# Patient Record
Sex: FEMALE | Race: ASIAN | NOT HISPANIC OR LATINO | Employment: UNEMPLOYED | URBAN - METROPOLITAN AREA
[De-identification: names, ages, dates, MRNs, and addresses within clinical notes are randomized per-mention and may not be internally consistent; named-entity substitution may affect disease eponyms.]

---

## 2018-11-18 ENCOUNTER — OCCUPATIONAL MEDICINE (OUTPATIENT)
Dept: URGENT CARE | Facility: CLINIC | Age: 53
End: 2018-11-18
Payer: COMMERCIAL

## 2018-11-18 ENCOUNTER — APPOINTMENT (OUTPATIENT)
Dept: RADIOLOGY | Facility: IMAGING CENTER | Age: 53
End: 2018-11-18
Attending: NURSE PRACTITIONER
Payer: COMMERCIAL

## 2018-11-18 ENCOUNTER — APPOINTMENT (OUTPATIENT)
Dept: RADIOLOGY | Facility: IMAGING CENTER | Age: 53
End: 2018-11-18
Attending: NURSE PRACTITIONER
Payer: MEDICAID

## 2018-11-18 VITALS
DIASTOLIC BLOOD PRESSURE: 64 MMHG | SYSTOLIC BLOOD PRESSURE: 116 MMHG | OXYGEN SATURATION: 99 % | HEART RATE: 68 BPM | TEMPERATURE: 97.7 F

## 2018-11-18 DIAGNOSIS — S50.01XA CONTUSION OF RIGHT ELBOW, INITIAL ENCOUNTER: ICD-10-CM

## 2018-11-18 DIAGNOSIS — S16.1XXA STRAIN OF NECK MUSCLE, INITIAL ENCOUNTER: ICD-10-CM

## 2018-11-18 DIAGNOSIS — S70.01XA CONTUSION OF RIGHT HIP, INITIAL ENCOUNTER: ICD-10-CM

## 2018-11-18 DIAGNOSIS — S40.011A CONTUSION OF RIGHT SHOULDER, INITIAL ENCOUNTER: ICD-10-CM

## 2018-11-18 DIAGNOSIS — S46.911A STRAIN OF RIGHT SHOULDER, INITIAL ENCOUNTER: ICD-10-CM

## 2018-11-18 LAB
AMP AMPHETAMINE: NORMAL
BAR BARBITURATES: NORMAL
BREATH ALCOHOL COMMENT: NORMAL
BZO BENZODIAZEPINES: NORMAL
COC COCAINE: NORMAL
INT CON NEG: NORMAL
INT CON POS: POSITIVE
MDMA ECSTASY: NORMAL
MET METHAMPHETAMINES: NORMAL
MTD METHADONE: NORMAL
OPI OPIATES: NORMAL
OXY OXYCODONE: NORMAL
PCP PHENCYCLIDINE: NORMAL
POC BREATHALIZER: 0 PERCENT (ref 0–0.01)
POC URINE DRUG SCREEN OCDRS: NEGATIVE
THC: NORMAL

## 2018-11-18 PROCEDURE — 80305 DRUG TEST PRSMV DIR OPT OBS: CPT | Mod: 29 | Performed by: NURSE PRACTITIONER

## 2018-11-18 PROCEDURE — 73502 X-RAY EXAM HIP UNI 2-3 VIEWS: CPT | Mod: 26,RT | Performed by: NURSE PRACTITIONER

## 2018-11-18 PROCEDURE — 73030 X-RAY EXAM OF SHOULDER: CPT | Mod: 26,RT,29 | Performed by: NURSE PRACTITIONER

## 2018-11-18 PROCEDURE — 73080 X-RAY EXAM OF ELBOW: CPT | Mod: 26,RT,29 | Performed by: NURSE PRACTITIONER

## 2018-11-18 PROCEDURE — 99203 OFFICE O/P NEW LOW 30 MIN: CPT | Mod: 29 | Performed by: NURSE PRACTITIONER

## 2018-11-18 PROCEDURE — 82075 ASSAY OF BREATH ETHANOL: CPT | Mod: 29 | Performed by: NURSE PRACTITIONER

## 2018-11-18 RX ORDER — CYCLOBENZAPRINE HCL 10 MG
10 TABLET ORAL 3 TIMES DAILY PRN
Qty: 30 TAB | Refills: 0 | Status: SHIPPED | OUTPATIENT
Start: 2018-11-18 | End: 2020-06-10

## 2018-11-18 NOTE — LETTER
[facility] [address] [place] [address]  Phone:  [phone] - Fax:  [phone]   [facility] Progress Report and Disability Certification  Date of Service: 11/18/2018   No Show:  No  Date / Time of Next Visit: 11/21/2018@9:15am   Claim Information   Patient Name: Janell Sterling  Claim Number:     Employer: [employer]  Date of Injury: 11/15/2018     Insurer / TPA: [employer]  ID / SSN:     Occupation: Dealer  Diagnosis: Diagnoses of Contusion of right shoulder, initial encounter, Contusion of right hip, initial encounter, Contusion of right elbow, initial encounter, Strain of right shoulder, initial encounter, and Strain of neck muscle, initial encounter were pertinent to this visit.    Medical Information   Related to Industrial Injury? Yes    Subjective Complaints:  DOI: 11/15/18  First visit.  Patient states that she was at work on 11/15/18 when she slipped on a wet floor.  She states that her right leg slipped out forward and she fell on her right side, hitting her hip and shoulder on the floor.  She strained the right side of her neck when she fell also.  She has been having pain to the right shoulder and right upper back since.  She reports that is her greatest area of pain.  Patient is also having pain in the right hip with painful ambulation.  Pain radiates into the right buttock and lower back.  She has been having pain and painful range of motion since.   Objective Findings: There is point tenderness to the posterior right shoulder and right upper back.  There is point tenderness over the right shoulder and.  Painful range of motion with abduction greater than 90 degrees.       are 5/5 and equal in the upper extremities.  Strength is 3/5 on the right and 5/5 on the left.      There is mild point tenderness to the medial and lateral aspect of the right elbow, limited range of motion with flexion.      There is  point tenderness to the lateral aspect of the right hip and patient has an antalgic gait.No point tenderness over the lumbar spine.    Pre-Existing Condition(s):     Assessment:   Initial Visit    Status: Additional Care Required  Permanent Disability:No    Plan: Medication  Comments:ibuprofen    Diagnostics: X-ray    Comments:  All x-rays negative     Disability Information   Status: Released to Restricted Duty    From:  2018  Through: 2018 Restrictions are: Temporary   Physical Restrictions   Sitting:    Standing:    Stooping:    Bending:      Squattin hrs/day Walking:  < or = to 2 hrs/day Climbin hrs/day Pushing:  < or = to 1 hr/day   Pulling:  < or = to 1 hr/day Other:    Reaching Above Shoulder (L):   Reaching Above Shoulder (R): 0 hrs/day     Reaching Below Shoulder (L):    Reaching Below Shoulder (R):      Not to exceed Weight Limits   Carrying(hrs): 4 Weight Limit(lb): < or = to 10 pounds Lifting(hrs): 4 Weight  Limit(lb): < or = to 10 pounds   Comments: Ibuprofen PRN, flexeril, ice, rest.  Clearly stated no driving or alcohol with Flexeril and do not take this at work.    Repetitive Actions   Hands: i.e. Fine Manipulations from Grasping:     Feet: i.e. Operating Foot Controls:     Driving / Operate Machinery:     Physician Name: Cathey J Hamman, A.P.N. Physician Signature: e-SignHAMMAN, CATHEY J A.P.N. e-Signature: Dr. Joshua Infante, Medical Director   Clinic Name / Location: [address] [address] Clinic Phone Number: Dept: [phone]   Appointment Time: 2:45 Pm Visit Start Time: 3:20 PM   Check-In Time:  2:52 Pm Visit Discharge Time:  5:35pm   Original-Treating Physician or Chiropractor    Page 2-Insurer/TPA    Page 3-Employer    Page 4-Employee

## 2018-11-18 NOTE — LETTER
EMPLOYEE’S CLAIM FOR COMPENSATION/ REPORT OF INITIAL TREATMENT  FORM C-4    EMPLOYEE’S CLAIM - PROVIDE ALL INFORMATION REQUESTED   First Name  Janell Last Name  Asher Birthdate                    1965                Sex  female Claim Number   Home Address  [address] Age  53 y.o. 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 (if applicable)  slipped    How did this injury or occupational disease occur? (Be specific an answer in detail. Use additional sheet if necessary)  Falling/slipping over wet floor in bathroom that was mopped.   If you believe that you have an occupational disease, when did you first have knowledge of the disability and it relationship to your employment?  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  I hereby authorize any physician, chiropractor, surgeon, practitioner, or other person, any hospital, including [facility] or [facility] hospital, any medical service organization, any insurance company, or other institution or organization to release to each other, any medical or other information, including benefits paid or payable, pertinent to this injury or disease, except information relative to diagnosis, treatment and/or counseling for AIDS, psychological conditions, alcohol or controlled substances, for which I must give specific authorization.  A Photostat of this authorization shall be as valid as the original.     Date   Place   Employee’s Signature   THIS REPORT MUST BE COMPLETED AND MAILED WITHIN 3 WORKING DAYS OF TREATMENT   Place  [facility]  Name of Facility  [facility]   Date  11/18/2018 Diagnosis  (S40.011A) Contusion of right shoulder, initial encounter  (S70.01XA) Contusion of right hip, initial encounter  (S50.01XA) Contusion of right elbow, initial encounter  (S46.911A) Strain of right shoulder, initial encounter  (S16.1XXA) Strain of neck muscle, initial encounter Is there evidence the injured employee was under the influence of alcohol and/or another controlled substance at the time of accident?   Hour  3:20 PM Description of Injury or Disease  Diagnoses of Contusion of right shoulder, initial encounter, Contusion of right hip, initial encounter, Contusion of right elbow, initial encounter, Strain of right shoulder, initial encounter, and Strain of neck muscle, initial encounter were pertinent to this visit. No   Treatment  Rest, ice, ibuprofen, flexeril  Have you advised the patient to remain off work five days or more? No   X-Ray Findings  Negative   If Yes   From Date  To Date      From information given by the employee, together with medical evidence, can you directly connect this  "injury or occupational disease as job incurred?  Yes If No Full Duty  No Modified Duty  Yes   Is additional medical care by a physician indicated?  Yes If Modified Duty, Specify any Limitations / Restrictions  Limited use of the right upper extremity, limited walking    Do you know of any previous injury or disease contributing to this condition or occupational disease?                            No   Date  11/18/2018 Print Doctor’s Name Cathey J Hamman, A.P.N. I certify the employer’s copy of  this form was mailed on:   Address  [address] [place] [address] [address] Insurer’s Use Only     [facility] Zip  [address]    Provider’s Tax ID Number  [id number] Telephone  Dept: [phone]        e-SignHAMMAN, CATHEY J A.P.N.   e-Signature: Dr. Joshua Infante, Medical Director Degree  APMARILYN        ORIGINAL-TREATING PHYSICIAN OR CHIROPRACTOR    PAGE 2-INSURER/TPA    PAGE 3-EMPLOYER    PAGE 4-EMPLOYEE             Form C-4 (rev10/07)              BRIEF DESCRIPTION OF RIGHTS AND BENEFITS  (Pursuant to [place] 616C.050)    Notice of Injury or Occupational Disease (Incident Report Form C-1): If an injury or occupational disease (OD) arises out of and in the  course of employment, you must provide written notice to your employer as soon as practicable, but no later than 7 days after the accident or  OD. Your employer shall maintain a sufficient supply of the required forms.    Claim for Compensation (Form C-4): If medical treatment is sought, the form C-4 is available at the place of initial treatment. A completed  \"Claim for Compensation\" (Form C-4) must be filed within 90 days after an accident or OD. The treating physician or chiropractor must,  within 3 working days after treatment, complete and mail to the employer, the employer's insurer and third-party , the Claim for  Compensation.    Medical Treatment: If you require medical treatment for your on-the-job injury or OD, you may be required to select a physician " or  chiropractor from a list provided by your workers’ compensation insurer, if it has contracted with an Organization for Managed Care (MCO) or  Preferred Provider Organization (PPO) or providers of health care. If your employer has not entered into a contract with an MCO or PPO, you  may select a physician or chiropractor from the Panel of Physicians and Chiropractors. Any medical costs related to your industrial injury or  OD will be paid by your insurer.    Temporary Total Disability (TTD): If your doctor has certified that you are unable to work for a period of at least 5 consecutive days, or 5  cumulative days in a 20-day period, or places restrictions on you that your employer does not accommodate, you may be entitled to TTD  compensation.    Temporary Partial Disability (TPD): If the wage you receive upon reemployment is less than the compensation for TTD to which you are  entitled, the insurer may be required to pay you TPD compensation to make up the difference. TPD can only be paid for a maximum of 24  months.    Permanent Partial Disability (PPD): When your medical condition is stable and there is an indication of a PPD as a result of your injury or  OD, within 30 days, your insurer must arrange for an evaluation by a rating physician or chiropractor to determine the degree of your PPD. The  amount of your PPD award depends on the date of injury, the results of the PPD evaluation and your age and wage.    Permanent Total Disability (PTD): If you are medically certified by a treating physician or chiropractor as permanently and totally disabled  and have been granted a PTD status by your insurer, you are entitled to receive monthly benefits not to exceed 66 2/3% of your average  monthly wage. The amount of your PTD payments is subject to reduction if you previously received a PPD award.    Vocational Rehabilitation Services: You may be eligible for vocational rehabilitation services if you are unable to  return to the job due to a  permanent physical impairment or permanent restrictions as a result of your injury or occupational disease.    Transportation and Per Sondra Reimbursement: You may be eligible for travel expenses and per sondra associated with medical treatment.    Reopening: You may be able to reopen your claim if your condition worsens after claim closure.    Appeal Process: If you disagree with a written determination issued by the insurer or the insurer does not respond to your request, you may  appeal to the Department of Administration, , by following the instructions contained in your determination letter. You must  appeal the determination within 70 days from the date of the determination letter at [address], [address], [place], [place]  [address], or [address], [address], [place], [place] [address]. If you disagree with the  decision, you may appeal to the  Department of Administration, . You must file your appeal within 30 days from the date of the  decision  letter at [address], [address], [place], [place] [address], or [address] [facility] [address], [place] [address]. If you  disagree with a decision of an , you may file a petition for judicial review with the District Court. You must do so within 30  days of the Appeal Officer’s decision. You may be represented by an  at your own expense or you may contact the [facility] for possible  representation.    [place]  for Injured Workers (NAIW): If you disagree with a  decision, you may request that NAIW represent you  without charge at an  Hearing. For information regarding denial of benefits, you may contact the [facility] at: [address], [address], [place] [address], [phone], or [address] [facility] [address], [place] [address], [phone]    To File a Complaint with the  Division: If you wish to file a complaint with the  of the Division of Industrial Relations (DIR),  please contact the Workers’ Compensation Section, [address], [address], [place], [place] [address], telephone [phone], or  [address], [address], [place], [place] [address], telephone [phone].    For assistance with Workers’ Compensation Issues: you may contact the Office of the Governor Consumer Health Assistance, [address] [place] [address], [address], [place], [place] [address], Toll Free 1-317.159.6221, Web site: [email or web address].[email or web address].us, E-mail  [email or web address].[email or web address].                                                                                                                                                                                                                                   __________________________________________________________________                                                                   _________________                Employee Name / Signature                                                                                                                                                       Date                                                                                                                                                                                                     D-2 (rev. 10/07)

## 2018-11-18 NOTE — PROGRESS NOTES
Subjective:      Janell Sterling is a 53 y.o. female who presents with Arm Injury (x3 days, slipped on the restroom floor at work, hurt right shoulder )    History reviewed. No pertinent past medical history.  Social History     Social History   • Marital status:      Spouse name: N/A   • Number of children: N/A   • Years of education: N/A     Occupational History   • Not on file.     Social History Main Topics   • Smoking status: Never Smoker   • Smokeless tobacco: Never Used   • Alcohol use Not on file   • Drug use: Unknown   • Sexual activity: Not on file     Other Topics Concern   • Not on file     Social History Narrative   • No narrative on file     History reviewed. No pertinent family history.    Allergies: Patient has no known allergies.    Patient is a 53-year-old female who presents with complaint of pain to the right shoulder and right hip.  3 days ago, she fell at work after slipping on a wet floor and fell on her right side.  Has been having pain since.         Arm Injury   This is a new problem. Episode onset: 11/15/18. The problem occurs constantly. The problem has been unchanged. Exacerbated by: movement. She has tried nothing for the symptoms. The treatment provided no relief.       Review of Systems   Musculoskeletal:        Right arm and shoulder pain   All other systems reviewed and are negative.         Objective:     /64   Pulse 68   Temp 36.5 °C (97.7 °F)   SpO2 99%      Physical Exam   Constitutional: She is oriented to person, place, and time. She appears well-developed and well-nourished.   Musculoskeletal:        Right hip: She exhibits tenderness.        Arms:       Legs:  Neurological: She is alert and oriented to person, place, and time.   Skin: Skin is warm and dry. Capillary refill takes less than 2 seconds.   Psychiatric: She has a normal mood and affect. Her behavior is normal. Judgment and thought content normal.   Vitals reviewed.        There is point tenderness to  the posterior right shoulder and right upper back.  There is point tenderness over the right shoulder and.  Painful range of motion with abduction greater than 90 degrees.       are 5/5 and equal in the upper extremities.  Strength is 3/5 on the right and 5/5 on the left.      There is mild point tenderness to the medial and lateral aspect of the right elbow, limited range of motion with flexion.      There is point tenderness to the lateral aspect of the right hip and patient has an antalgic gait.No point tenderness over the lumbar spine.        XR hip: negative per radiologist    XR shoulder:negative per radiologist    XR elbow: negative per radiologist     Assessment/Plan:   Right hip contusion  Right elbow contusion  Right shoulder contusion  Neck muscle strain    Ice  Rest  See D39 for restrictions  Ibuprofen  Flexeril  Return in 4 days for recheck.  There are no diagnoses linked to this encounter.

## 2018-11-21 ENCOUNTER — OCCUPATIONAL MEDICINE (OUTPATIENT)
Dept: OCCUPATIONAL MEDICINE | Facility: CLINIC | Age: 53
End: 2018-11-21
Payer: COMMERCIAL

## 2018-11-21 VITALS
TEMPERATURE: 96.1 F | DIASTOLIC BLOOD PRESSURE: 62 MMHG | WEIGHT: 133 LBS | RESPIRATION RATE: 16 BRPM | BODY MASS INDEX: 22.71 KG/M2 | HEIGHT: 64 IN | OXYGEN SATURATION: 99 % | HEART RATE: 84 BPM | SYSTOLIC BLOOD PRESSURE: 110 MMHG

## 2018-11-21 DIAGNOSIS — S70.01XD CONTUSION OF RIGHT HIP, SUBSEQUENT ENCOUNTER: ICD-10-CM

## 2018-11-21 DIAGNOSIS — S40.011D CONTUSION OF RIGHT SHOULDER, SUBSEQUENT ENCOUNTER: ICD-10-CM

## 2018-11-21 PROCEDURE — 99202 OFFICE O/P NEW SF 15 MIN: CPT | Performed by: PREVENTIVE MEDICINE

## 2018-11-21 NOTE — PROGRESS NOTES
"Subjective:      Janell Sterling is a 53 y.o. female who presents with Follow-Up (WC DOI 11/15/18 rt ivelisse, feeling better room 21)      DOI 11/15/2018: 54 yo female presents with right shoulder and hip injury. she fell at work after slipping on a wet floor and fell on her right side. XR Right, Right Shoulder and right elbow were negative for acute findings. Patient states overall she feels much improved.  Only has minimal pain at the right elbow at this point.  Feels ready for release.     HPI    ROS  ROS: All systems were reviewed on intake form, form was reviewed and signed. See scanned documents in media. Pertinent positives and negatives included in HPI.    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 She appears well-developed and well-nourished.   Cardiovascular: Normal rate.    Pulmonary/Chest: Effort normal.   Neurological: She is alert and oriented to person, place, and time.   Skin: Skin is warm and dry.   Psychiatric: She has a normal mood and affect. Judgment normal.       Right Shoulder: No gross deformity.  No tenderness.  Full range of motion.  Right hip: No gross deformity.  Full range of motion.  Normal gait.       Assessment/Plan:     1. Contusion of right shoulder, subsequent encounter  2. Contusion of right hip, subsequent encounter    Released from care  Full duty  Follow-up as needed      "

## 2018-11-21 NOTE — LETTER
[address],   [address] [place] [address]  Phone:  [phone] - Fax:  [phone]   [facility] Progress Report and Disability Certification  Date of Service: 11/21/2018   No Show:  No  Date / Time of Next Visit:  MMI   Claim Information   Patient Name: Janell Sterling  Claim Number:     Employer: [employer]  Date of Injury: 11/15/2018     Insurer / TPA: [employer]  ID / SSN:     Occupation: Dealer  Diagnosis: Diagnoses of Contusion of right shoulder, subsequent encounter and Contusion of right hip, subsequent encounter were pertinent to this visit.    Medical Information   Related to Industrial Injury? Yes    Subjective Complaints:  DOI 11/15/2018: 52 yo female presents with right shoulder and hip injury. she fell at work after slipping on a wet floor and fell on her right side. XR Right, Right Shoulder and right elbow were negative for acute findings. Patient states overall she feels much improved.  Only has minimal pain at the right elbow at this point.  Feels ready for release.   Objective Findings: Right Shoulder: No gross deformity.  No tenderness.  Full range of motion.  Right hip: No gross deformity.  Full range of motion.  Normal gait.   Pre-Existing Condition(s):     Assessment:   Condition Improved    Status: Discharged /  MMI  Permanent Disability:No    Plan:      Diagnostics:      Comments:  Released from care  Full duty  Follow-up as needed    Disability Information   Status: Released to Full Duty    From:  11/21/2018  Through:   Restrictions are:     Physical Restrictions   Sitting:    Standing:    Stooping:    Bending:      Squatting:    Walking:    Climbing:    Pushing:      Pulling:    Other:    Reaching Above Shoulder (L):   Reaching Above Shoulder (R):       Reaching Below Shoulder (L):    Reaching Below Shoulder (R):      Not to exceed Weight Limits   Carrying(hrs):   Weight Limit(lb):    Lifting(hrs):   Weight  Limit(lb):     Comments:      Repetitive Actions   Hands: i.e. Fine Manipulations from Grasping:     Feet: i.e. Operating Foot Controls:     Driving / Operate Machinery:     Physician Name: Rito Mccormick D.O. Physician Signature: RITO Rodríguez D.O. e-Signature: Dr. Joshua Infante, Medical Director   Clinic Name / Location: [address],   [address] [place] [address] Clinic Phone Number: Dept: [phone]   Appointment Time: 9:15 Am Visit Start Time: 9:28 AM   Check-In Time:  9:27 Am Visit Discharge Time:  9:38 AM   Original-Treating Physician or Chiropractor    Page 2-Insurer/TPA    Page 3-Employer    Page 4-Employee

## 2020-06-10 ENCOUNTER — OFFICE VISIT (OUTPATIENT)
Dept: MEDICAL GROUP | Facility: MEDICAL CENTER | Age: 55
End: 2020-06-10
Attending: NURSE PRACTITIONER
Payer: MEDICAID

## 2020-06-10 VITALS
WEIGHT: 129.1 LBS | BODY MASS INDEX: 22.88 KG/M2 | SYSTOLIC BLOOD PRESSURE: 104 MMHG | HEIGHT: 63 IN | OXYGEN SATURATION: 98 % | DIASTOLIC BLOOD PRESSURE: 64 MMHG | TEMPERATURE: 98.7 F | HEART RATE: 76 BPM

## 2020-06-10 DIAGNOSIS — Z13.228 SCREENING FOR METABOLIC DISORDER: ICD-10-CM

## 2020-06-10 DIAGNOSIS — Z76.89 ENCOUNTER TO ESTABLISH CARE: ICD-10-CM

## 2020-06-10 DIAGNOSIS — Z13.21 ENCOUNTER FOR VITAMIN DEFICIENCY SCREENING: ICD-10-CM

## 2020-06-10 DIAGNOSIS — R07.89 OTHER CHEST PAIN: ICD-10-CM

## 2020-06-10 DIAGNOSIS — M79.641 PAIN IN BOTH HANDS: ICD-10-CM

## 2020-06-10 DIAGNOSIS — M79.642 PAIN IN BOTH HANDS: ICD-10-CM

## 2020-06-10 DIAGNOSIS — J45.40 MODERATE PERSISTENT ASTHMA WITHOUT COMPLICATION: ICD-10-CM

## 2020-06-10 DIAGNOSIS — Z13.6 SCREENING FOR CARDIOVASCULAR CONDITION: ICD-10-CM

## 2020-06-10 PROCEDURE — 99214 OFFICE O/P EST MOD 30 MIN: CPT | Performed by: NURSE PRACTITIONER

## 2020-06-10 RX ORDER — FLUTICASONE PROPIONATE 50 MCG
1 SPRAY, SUSPENSION (ML) NASAL DAILY
Qty: 16 G | Refills: 11 | Status: SHIPPED | OUTPATIENT
Start: 2020-06-10

## 2020-06-10 RX ORDER — BUDESONIDE AND FORMOTEROL FUMARATE DIHYDRATE 80; 4.5 UG/1; UG/1
2 AEROSOL RESPIRATORY (INHALATION) 2 TIMES DAILY
Qty: 1 INHALER | Refills: 11 | Status: SHIPPED | OUTPATIENT
Start: 2020-06-10 | End: 2020-08-13 | Stop reason: SDUPTHER

## 2020-06-10 RX ORDER — ACETAMINOPHEN 500 MG
500-1000 TABLET ORAL EVERY 6 HOURS PRN
Qty: 120 TAB | Refills: 5 | Status: SHIPPED | OUTPATIENT
Start: 2020-06-10

## 2020-06-10 RX ORDER — ALBUTEROL SULFATE 90 UG/1
2 AEROSOL, METERED RESPIRATORY (INHALATION) EVERY 6 HOURS PRN
Qty: 8.5 G | Refills: 11 | Status: SHIPPED | OUTPATIENT
Start: 2020-06-10

## 2020-06-10 ASSESSMENT — ENCOUNTER SYMPTOMS
SPUTUM PRODUCTION: 0
ABDOMINAL PAIN: 0
FEVER: 0
NERVOUS/ANXIOUS: 1
SHORTNESS OF BREATH: 1
CHILLS: 0
PALPITATIONS: 0
WHEEZING: 1
COUGH: 0
BLOOD IN STOOL: 0
DIARRHEA: 0
CONSTIPATION: 0
WEIGHT LOSS: 0

## 2020-06-10 NOTE — PROGRESS NOTES
Chief Complaint   Patient presents with   • Establish Care   • Asthma       Subjective:     HPI:   Janell Sterling is a 55 y.o. female here to establish care, asthma,  and to discuss the evaluation and management of:      Encounter to establish care  Prior PCP: BAYLEE    Other Providers:  none    Moderate persistent asthma without complication  Dx/onset:15 years  Frequency of SOB: with activity and stairs especially   Frequency of inhaler use: she does not have one, but thinks she would use it about 3 times per day  Ability to participate in ADLs: able to participate, but SOB  Frequency of night awakenings: none  Aggravating factors: walking fast, stairs, smoke, allergies   Alleviating factors: inhaler  Hospitalizations: none    She does have seasonal allergies with itchy watery eyes, rhinorrhea, and congestion.  She has used claritin in the past- this was helpful.  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  She has weakness in both hands, she is unable to open bottles or make [place] bread any more.  She has taken tylenol and ibuprofen- both helpful, but she does not like the way Ib makes her feel.  She reports the pain is worse at night.     Other chest pain  Started about 7 months ago.  She has CP with stress and anxiety.  She gets SOB, sweaty, shaking.  It is intermittent in nature.  She reports the pain will last for several minutes and then resolved.  She reports the pain radiates up into her neck.  She gets relief with laying down and working on calming down.  She has not tried any medications for this.  She thinks this is caused by anxiety but would like to make sure it is not her heart.        ROS  Review of Systems   Constitutional: Negative for chills, fever, malaise/fatigue and weight loss.   Respiratory: Positive for shortness of breath and wheezing. Negative for cough and sputum  "production.    Cardiovascular: Positive for chest pain. Negative for palpitations and leg swelling.   Gastrointestinal: Negative for abdominal pain, blood in stool, constipation and diarrhea.   Psychiatric/Behavioral: The patient is nervous/anxious.          No Known Allergies    Current medicines (including changes today)  Current Outpatient Medications   Medication Sig Dispense Refill   • budesonide-formoterol (SYMBICORT) 80-4.5 MCG/ACT Aerosol Inhale 2 Puffs by mouth 2 Times a Day. 1 Inhaler 11   • albuterol 108 (90 Base) MCG/ACT Aero Soln inhalation aerosol Inhale 2 Puffs by mouth every 6 hours as needed for Shortness of Breath. 8.5 g 11   • fluticasone (FLONASE) 50 MCG/ACT nasal spray Spray 1 Spray in nose every day. 16 g 11   • acetaminophen (TYLENOL) 500 MG Tab Take 1-2 Tabs by mouth every 6 hours as needed for Moderate Pain. Max dose 3000 mg in 24 hours 120 Tab 5     No current facility-administered medications for this visit.      She  has a past medical history of Asthma.  She  has no past surgical history on file.  Social History     Tobacco Use   • Smoking status: Never Smoker   • Smokeless tobacco: Never Used   Substance Use Topics   • Alcohol use: No   • Drug use: No       History reviewed. No pertinent family history.  No family status information on file.       Patient Active Problem List    Diagnosis Date Noted   • Encounter to establish care 06/10/2020   • Moderate persistent asthma without complication 06/10/2020   • Pain in both hands 06/10/2020   • Other chest pain 06/10/2020          Objective:     /64 (BP Location: Left arm, Patient Position: Sitting, BP Cuff Size: Adult)   Pulse 76   Temp 37.1 °C (98.7 °F) (Temporal)   Ht 1.6 m (5' 3\")   Wt 58.6 kg (129 lb 1.6 oz)   SpO2 98%  Body mass index is 22.87 kg/m².    Physical Exam:  Physical Exam   Constitutional: She is oriented to person, place, and time and well-developed, well-nourished, and in no distress. No distress.   HENT: "   Head: Normocephalic.   Right Ear: Tympanic membrane and external ear normal.   Left Ear: Tympanic membrane and external ear normal.   Eyes: Pupils are equal, round, and reactive to light. Conjunctivae and EOM are normal.   Neck: Normal range of motion. Neck supple. No tracheal deviation present.   Cardiovascular: Normal rate, regular rhythm, normal heart sounds and intact distal pulses.   Pulmonary/Chest: Effort normal and breath sounds normal.   Abdominal: Soft. Bowel sounds are normal.   Musculoskeletal: Normal range of motion.   Lymphadenopathy:        Head (right side): No preauricular adenopathy present.        Head (left side): No preauricular adenopathy present.     She has no cervical adenopathy.   Neurological: She is alert and oriented to person, place, and time. She has normal sensation, normal strength and intact cranial nerves. Gait normal.   Skin: Skin is warm and dry.   Psychiatric: Affect and judgment normal.          Assessment and Plan:     The following treatment plan was discussed:    1. Moderate persistent asthma without complication  budesonide-formoterol (SYMBICORT) 80-4.5 MCG/ACT Aerosol    albuterol 108 (90 Base) MCG/ACT Aero Soln inhalation aerosol    fluticasone (FLONASE) 50 MCG/ACT nasal spray  -Chronic problem, unstable.  Will restart her on Symbicort as this is been beneficial in the past.  I have also provided her with albuterol rescue inhaler.  We discussed when to use which inhaler and why.  I have also advised her to start Claritin again, available OTC.  We will also add Flonase to address her allergic component.  ER precautions discussed.   2. Other chest pain  NM-CARDIAC STRESS TEST  -Chronic problem, unstable.  Suspect this is related to anxiety, however we would both like to rule out cardiac cause.  ER precautions discussed.   3. Pain in both hands  DX-HAND 2- LEFT    DX-HAND 2- RIGHT    acetaminophen (TYLENOL) 500 MG Tab  -Chronic problem, unstable.  We will get bilateral  x-rays and start her on Tylenol.  She is not interested in physical therapy or physiatry at this point, but knows this is an option in the future.   4. Encounter to establish care     5. Screening for metabolic disorder  HEMOGLOBIN A1C    TSH WITH REFLEX TO FT4   6. Screening for cardiovascular condition  Lipid Profile   7. Encounter for vitamin deficiency screening  VITAMIN D,25 HYDROXY       Any change or worsening of signs or symptoms, patient encouraged to follow-up or report to emergency room for further evaluation. Patient verbalizes understanding and agrees.    Follow-Up: Return in about 6 weeks (around 7/22/2020) for Asthma.      PLEASE NOTE: This dictation was created using voice recognition software. I have made every reasonable attempt to correct obvious errors, but I expect that there are errors of grammar and possibly content that I did not discover before finalizing the note.

## 2020-06-10 NOTE — ASSESSMENT & PLAN NOTE
Started about 7 months ago.  She has CP with stress and anxiety.  She gets SOB, sweaty, shaking.  It is intermittent in nature.  She reports the pain will last for several minutes and then resolved.  She reports the pain radiates up into her neck.  She gets relief with laying down and working on calming down.  She has not tried any medications for this.  She thinks this is caused by anxiety but would like to make sure it is not her heart.

## 2020-06-10 NOTE — ASSESSMENT & PLAN NOTE
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  She has weakness in both hands, she is unable to open bottles or make [place] bread any more.  She has taken tylenol and ibuprofen- both helpful, but she does not like the way Ib makes her feel.  She reports the pain is worse at night.

## 2020-06-10 NOTE — ASSESSMENT & PLAN NOTE
Dx/onset:15 years  Frequency of SOB: with activity and stairs especially   Frequency of inhaler use: she does not have one, but thinks she would use it about 3 times per day  Ability to participate in ADLs: able to participate, but SOB  Frequency of night awakenings: none  Aggravating factors: walking fast, stairs, smoke, allergies   Alleviating factors: inhaler  Hospitalizations: none    She does have seasonal allergies with itchy watery eyes, rhinorrhea, and congestion.  She has used claritin in the past- this was helpful.  Her allergies contribute to her asthma

## 2020-07-13 ENCOUNTER — HOSPITAL ENCOUNTER (OUTPATIENT)
Dept: RADIOLOGY | Facility: MEDICAL CENTER | Age: 55
End: 2020-07-13
Attending: NURSE PRACTITIONER
Payer: MEDICAID

## 2020-07-13 ENCOUNTER — HOSPITAL ENCOUNTER (OUTPATIENT)
Dept: LAB | Facility: MEDICAL CENTER | Age: 55
End: 2020-07-13
Attending: NURSE PRACTITIONER
Payer: MEDICAID

## 2020-07-13 DIAGNOSIS — Z13.21 ENCOUNTER FOR VITAMIN DEFICIENCY SCREENING: ICD-10-CM

## 2020-07-13 DIAGNOSIS — R07.89 OTHER CHEST PAIN: ICD-10-CM

## 2020-07-13 DIAGNOSIS — Z13.228 SCREENING FOR METABOLIC DISORDER: ICD-10-CM

## 2020-07-13 DIAGNOSIS — Z13.6 SCREENING FOR CARDIOVASCULAR CONDITION: ICD-10-CM

## 2020-07-13 LAB
25(OH)D3 SERPL-MCNC: 20 NG/ML (ref 30–100)
CHOLEST SERPL-MCNC: 183 MG/DL (ref 100–199)
EST. AVERAGE GLUCOSE BLD GHB EST-MCNC: 126 MG/DL
FASTING STATUS PATIENT QL REPORTED: NORMAL
HBA1C MFR BLD: 6 % (ref 0–5.6)
HDLC SERPL-MCNC: 54 MG/DL
LDLC SERPL CALC-MCNC: 114 MG/DL
T4 FREE SERPL-MCNC: 0.86 NG/DL (ref 0.93–1.7)
TRIGL SERPL-MCNC: 73 MG/DL (ref 0–149)
TSH SERPL DL<=0.005 MIU/L-ACNC: 5.61 UIU/ML (ref 0.38–5.33)

## 2020-07-13 PROCEDURE — 83036 HEMOGLOBIN GLYCOSYLATED A1C: CPT

## 2020-07-13 PROCEDURE — 36415 COLL VENOUS BLD VENIPUNCTURE: CPT

## 2020-07-13 PROCEDURE — 82306 VITAMIN D 25 HYDROXY: CPT

## 2020-07-13 PROCEDURE — 93017 CV STRESS TEST TRACING ONLY: CPT

## 2020-07-13 PROCEDURE — 80061 LIPID PANEL: CPT

## 2020-07-13 PROCEDURE — 84439 ASSAY OF FREE THYROXINE: CPT

## 2020-07-13 PROCEDURE — 84443 ASSAY THYROID STIM HORMONE: CPT

## 2020-07-30 NOTE — RESULT ENCOUNTER NOTE
Vaibhav Maciel-       I hope you are doing well, I know you have been going through some things with your family.  Please call [phone] to schedule an appointment with me so we can review your lab results.  If you have access to a computer or smart phone we can even do a virtual visit.  [facility]ita

## 2020-08-05 NOTE — RESULT ENCOUNTER NOTE
PLEASE CALL NOT MYCHART- I have already sent a mychart. Please call pt and let them know I would like her to schedule an appointment with me to review her lab results.

## 2020-08-06 ENCOUNTER — TELEPHONE (OUTPATIENT)
Dept: MEDICAL GROUP | Facility: MEDICAL CENTER | Age: 55
End: 2020-08-06

## 2020-08-06 NOTE — TELEPHONE ENCOUNTER
----- Message from JUNIOR Pelaez sent at 8/5/2020  2:44 PM PDT -----  PLEASE CALL NOT MYCHART- I have already sent a mychart. Please call pt and let them know I would like her to schedule an appointment with me to review her lab results.

## 2020-08-13 ENCOUNTER — OFFICE VISIT (OUTPATIENT)
Dept: MEDICAL GROUP | Facility: MEDICAL CENTER | Age: 55
End: 2020-08-13
Attending: NURSE PRACTITIONER
Payer: MEDICAID

## 2020-08-13 VITALS
WEIGHT: 131.2 LBS | HEART RATE: 71 BPM | SYSTOLIC BLOOD PRESSURE: 100 MMHG | HEIGHT: 63 IN | BODY MASS INDEX: 23.25 KG/M2 | DIASTOLIC BLOOD PRESSURE: 60 MMHG | OXYGEN SATURATION: 98 % | TEMPERATURE: 98.7 F

## 2020-08-13 DIAGNOSIS — E03.8 OTHER SPECIFIED HYPOTHYROIDISM: ICD-10-CM

## 2020-08-13 DIAGNOSIS — J45.40 MODERATE PERSISTENT ASTHMA WITHOUT COMPLICATION: ICD-10-CM

## 2020-08-13 DIAGNOSIS — R94.31 ABNORMAL ECG DURING EXERCISE STRESS TEST: ICD-10-CM

## 2020-08-13 DIAGNOSIS — E55.9 VITAMIN D DEFICIENCY: ICD-10-CM

## 2020-08-13 DIAGNOSIS — R07.89 OTHER CHEST PAIN: ICD-10-CM

## 2020-08-13 PROCEDURE — 99214 OFFICE O/P EST MOD 30 MIN: CPT | Performed by: NURSE PRACTITIONER

## 2020-08-13 RX ORDER — BUDESONIDE AND FORMOTEROL FUMARATE DIHYDRATE 80; 4.5 UG/1; UG/1
2 AEROSOL RESPIRATORY (INHALATION) 2 TIMES DAILY
Qty: 1 EACH | Refills: 11 | Status: SHIPPED | OUTPATIENT
Start: 2020-08-13

## 2020-08-13 RX ORDER — LEVOTHYROXINE SODIUM 0.03 MG/1
25 TABLET ORAL
Qty: 30 TAB | Refills: 1 | Status: SHIPPED | OUTPATIENT
Start: 2020-08-13 | End: 2020-11-09

## 2020-08-13 RX ORDER — ERGOCALCIFEROL 1.25 MG/1
50000 CAPSULE ORAL
Qty: 12 CAP | Refills: 1 | Status: SHIPPED | OUTPATIENT
Start: 2020-08-13

## 2020-08-13 ASSESSMENT — ENCOUNTER SYMPTOMS
CHILLS: 0
COUGH: 0
BLOOD IN STOOL: 0
WHEEZING: 0
SHORTNESS OF BREATH: 1
CONSTIPATION: 0
DIARRHEA: 0
PALPITATIONS: 0
WEIGHT LOSS: 0
ABDOMINAL PAIN: 0
FEVER: 0

## 2020-08-13 ASSESSMENT — PATIENT HEALTH QUESTIONNAIRE - PHQ9
SUM OF ALL RESPONSES TO PHQ QUESTIONS 1-9: 9
CLINICAL INTERPRETATION OF PHQ2 SCORE: 2
5. POOR APPETITE OR OVEREATING: 1 - SEVERAL DAYS

## 2020-08-13 NOTE — ASSESSMENT & PLAN NOTE
I patient continues to have intermittent chest pain, she reports this is worsened since her stress test.  She reports she feels chest pain at rest and with activity.  She describes it just left of her mid sternum.  She describes it as feeling like someone is putting pressure on her chest or poking through her ribs.  She feels that pain radiates under her breast and up into her left armpit.  She denies diaphoresis. She reports shortness of breath.  Her treadmill stress test showed the following:   No evidence of significant jeopardized viable myocardium or prior myocardial    infarction.   Normal left ventricular size, ejection fraction, and wall motion.   ECG INTERPRETATION   Positive stress ECG for ischemia.    Intermediate risk duke treadmill score (+1)

## 2020-08-13 NOTE — PROGRESS NOTES
Chief Complaint   Patient presents with   • Follow-Up   • Lab Results       Subjective:     HPI:   Janell Sterling is a 55 y.o. female here to discuss the evaluation and management of:        Other specified hypothyroidism  Onset: NEW  Current treatment: NNone  Last dose adjustment: NA  Last thyroid labs: 7/13/20 TSH was 5.61 and free T4 was 0.86  History of thyroid nodules or thyroid surgery: no  Associated symptoms: Reports fatigue, constipation, dry skin, cold intolerance, weight gain, shortness of breath.  Denies irregular menses, myalgias, and carpal tunnel ssx.         Other chest pain  I patient continues to have intermittent chest pain, she reports this is worsened since her stress test.  She reports she feels chest pain at rest and with activity.  She describes it just left of her mid sternum.  She describes it as feeling like someone is putting pressure on her chest or poking through her ribs.  She feels that pain radiates under her breast and up into her left armpit.  She denies diaphoresis. She reports shortness of breath.  Her treadmill stress test showed the following:   No evidence of significant jeopardized viable myocardium or prior myocardial    infarction.   Normal left ventricular size, ejection fraction, and wall motion.   ECG INTERPRETATION   Positive stress ECG for ischemia.    Intermediate risk duke treadmill score (+1)      ROS  Review of Systems   Constitutional: Negative for chills, fever, malaise/fatigue and weight loss.   Respiratory: Positive for shortness of breath. Negative for cough and wheezing.    Cardiovascular: Positive for chest pain. Negative for palpitations and leg swelling.   Gastrointestinal: Negative for abdominal pain, blood in stool, constipation and diarrhea.         No Known Allergies    Current medicines (including changes today)  Current Outpatient Medications   Medication Sig Dispense Refill   • levothyroxine (SYNTHROID) 25 MCG Tab Take 1 Tab by mouth Every morning on  "an empty stomach. 30 Tab 1   • ergocalciferol (DRISDOL) 13591 UNIT capsule Take 1 Cap by mouth every 7 days. 12 Cap 1   • budesonide-formoterol (SYMBICORT) 80-4.5 MCG/ACT Aerosol Inhale 2 Puffs by mouth 2 Times a Day. 1 Each 11   • albuterol 108 (90 Base) MCG/ACT Aero Soln inhalation aerosol Inhale 2 Puffs by mouth every 6 hours as needed for Shortness of Breath. 8.5 g 11   • fluticasone (FLONASE) 50 MCG/ACT nasal spray Spray 1 Spray in nose every day. 16 g 11   • acetaminophen (TYLENOL) 500 MG Tab Take 1-2 Tabs by mouth every 6 hours as needed for Moderate Pain. Max dose 3000 mg in 24 hours 120 Tab 5     No current facility-administered medications for this visit.        Social History     Tobacco Use   • Smoking status: Never Smoker   • Smokeless tobacco: Never Used   Substance Use Topics   • Alcohol use: No   • Drug use: No       Patient Active Problem List    Diagnosis Date Noted   • Other specified hypothyroidism 08/13/2020   • Encounter to establish care 06/10/2020   • Moderate persistent asthma without complication 06/10/2020   • Pain in both hands 06/10/2020   • Other chest pain 06/10/2020       History reviewed. No pertinent family history.       Objective:     /60 (BP Location: Right arm, Patient Position: Sitting, BP Cuff Size: Adult)   Pulse 71   Temp 37.1 °C (98.7 °F) (Temporal)   Ht 1.6 m (5' 3\")   Wt 59.5 kg (131 lb 3.2 oz)   SpO2 98%  Body mass index is 23.24 kg/m².    Physical Exam:  Physical Exam   Constitutional: She is oriented to person, place, and time and well-developed, well-nourished, and in no distress. No distress.   HENT:   Head: Normocephalic.   Right Ear: Tympanic membrane and external ear normal.   Left Ear: Tympanic membrane and external ear normal.   Eyes: Pupils are equal, round, and reactive to light. Conjunctivae and EOM are normal.   Neck: Normal range of motion. Neck supple. No tracheal deviation present.   Cardiovascular: Normal rate, regular rhythm, S1 normal, S2 " normal, normal heart sounds and intact distal pulses. PMI is not displaced.   No murmur heard.  Pulmonary/Chest: Effort normal and breath sounds normal.   Abdominal: Soft. Bowel sounds are normal.   Musculoskeletal: Normal range of motion.   Lymphadenopathy:        Head (right side): No preauricular adenopathy present.        Head (left side): No preauricular adenopathy present.     She has no cervical adenopathy.   Neurological: She is alert and oriented to person, place, and time. She has normal sensation, normal strength and intact cranial nerves. Gait normal.   Skin: Skin is warm and dry.   Psychiatric: Affect and judgment normal.       Assessment and Plan:     The following treatment plan was discussed:    1. Other chest pain  REFERRAL TO CARDIOLOGY General Cardiology BOSSMAN, General Cardiology MD  -Chronic problem, unstable.  Her chest pain is worsening.  I placed an urgent referral to cardiology.  ER precautions reviewed at length, patient verbalized understanding.  We were able to get the patient scheduled for the soonest available cardiology appointment on 9/1/2020.   2. Abnormal ECG during exercise stress test  -chronic problem, unstable.  See above.   3. Other specified hypothyroidism  levothyroxine (SYNTHROID) 25 MCG Tab    TSH WITH REFLEX TO FT4  -New problem, unstable.  We will initiate levothyroxine 25 mcg daily.  Potential side effects and discontinuation criteria discussed with patient, patient verbalized understanding.  We will repeat her TSH in 6 weeks.   4. Vitamin D deficiency  ergocalciferol (DRISDOL) 77015 UNIT capsule  -New problem, unstable.  Will initiate ergocalciferol 50,000 units weekly.   5. Moderate persistent asthma without complication  budesonide-formoterol (SYMBICORT) 80-4.5 MCG/ACT Aerosol  -Chronic problem, stable.  Medication refill.       Any change or worsening of signs or symptoms, patient encouraged to follow-up or report to emergency room for further evaluation. Patient  verbalizes understanding and agrees.    Follow-Up: Return in about 8 weeks (around 10/8/2020) for Thyroid check or sooner as needed.      PLEASE NOTE: This dictation was created using voice recognition software. I have made every reasonable attempt to correct obvious errors, but I expect that there are errors of grammar and possibly content that I did not discover before finalizing the note.

## 2020-08-13 NOTE — ASSESSMENT & PLAN NOTE
Onset: NEW  Current treatment: NNone  Last dose adjustment: NA  Last thyroid labs: 7/13/20 TSH was 5.61 and free T4 was 0.86  History of thyroid nodules or thyroid surgery: no  Associated symptoms: Reports fatigue, constipation, dry skin, cold intolerance, weight gain, shortness of breath.  Denies irregular menses, myalgias, and carpal tunnel ssx.

## 2020-09-01 ENCOUNTER — TELEPHONE (OUTPATIENT)
Dept: CARDIOLOGY | Facility: MEDICAL CENTER | Age: 55
End: 2020-09-01

## 2020-09-01 ENCOUNTER — OFFICE VISIT (OUTPATIENT)
Dept: CARDIOLOGY | Facility: MEDICAL CENTER | Age: 55
End: 2020-09-01
Payer: MEDICAID

## 2020-09-01 VITALS — WEIGHT: 132.2 LBS | OXYGEN SATURATION: 96 % | BODY MASS INDEX: 23.42 KG/M2 | HEIGHT: 63 IN | HEART RATE: 86 BPM

## 2020-09-01 DIAGNOSIS — R94.31 ABNORMAL ECG DURING EXERCISE STRESS TEST: ICD-10-CM

## 2020-09-01 DIAGNOSIS — I20.9 ANGINA PECTORIS (HCC): ICD-10-CM

## 2020-09-01 DIAGNOSIS — R07.89 OTHER CHEST PAIN: ICD-10-CM

## 2020-09-01 LAB — EKG IMPRESSION: NORMAL

## 2020-09-01 PROCEDURE — 93000 ELECTROCARDIOGRAM COMPLETE: CPT | Performed by: INTERNAL MEDICINE

## 2020-09-01 PROCEDURE — 99245 OFF/OP CONSLTJ NEW/EST HI 55: CPT | Performed by: INTERNAL MEDICINE

## 2020-09-01 RX ORDER — NITROGLYCERIN 0.4 MG/1
0.4 TABLET SUBLINGUAL PRN
Qty: 25 TAB | Refills: 11 | Status: SHIPPED | OUTPATIENT
Start: 2020-09-01

## 2020-09-01 RX ORDER — ISOSORBIDE MONONITRATE 30 MG/1
30 TABLET, EXTENDED RELEASE ORAL EVERY MORNING
Qty: 30 TAB | Refills: 11 | Status: SHIPPED | OUTPATIENT
Start: 2020-09-01

## 2020-09-01 RX ORDER — ASPIRIN 81 MG/1
81 TABLET, CHEWABLE ORAL DAILY
Qty: 100 TAB | COMMUNITY
Start: 2020-09-01

## 2020-09-01 ASSESSMENT — ENCOUNTER SYMPTOMS
FOCAL WEAKNESS: 1
BLURRED VISION: 0
BACK PAIN: 1
PALPITATIONS: 0
ABDOMINAL PAIN: 0
NERVOUS/ANXIOUS: 1
SHORTNESS OF BREATH: 0
NECK PAIN: 1
SORE THROAT: 0
MEMORY LOSS: 1
FALLS: 0
BRUISES/BLEEDS EASILY: 0
CHILLS: 0
PND: 0
CLAUDICATION: 0
DIZZINESS: 0
COUGH: 0
WEAKNESS: 0
FEVER: 0
ORTHOPNEA: 1
NAUSEA: 0

## 2020-09-01 NOTE — TELEPHONE ENCOUNTER
Patient is scheduled on 9-9-20 for a C w/poss with Dr. Wolfe. No meds to stop and patient to check in at 6:30 for an 8:30 procedure. H&P was done on 9-1-2020 by Dr. Carlin. Pre admit to call patient.

## 2020-09-01 NOTE — PATIENT INSTRUCTIONS
Work on at least 1.5 hours a week of moderate exercise (typical brisk walking or similar activity)    Please look into the following diets and incorporate them into your diet    LOW SALT DIET   KEEP YOUR SODIUM EQUAL TO CALORIES AND NO MORE THAN DOUBLE THE CALORIES FOR A LOW SALT DIET    Cardiosmart.org - great resource for American College of Cardiology on heart disease prevention and treatment      FOR TREATMENT OR PREVENTION OF CORONARY ARTERY DISEASE  These three programs are approved by Medicare/Insurers for those with heart disease  [place] - [facility]  Dr. Diaz's Program for Reversing Heart Disease - Aime Wise's Cardiologist vegetarian-based  [facility] - [place]-based mind-body Program    This is a commonly referenced Program  Dr Díaz - [place] (book and documentary) - vegetarian-based      FOR TREATMENT OF BLOOD PRESSURE  DASH DIET - American Heart Association for treatment of HYPERTENSION    FOR TREATMENT OF BAD CHOLESTEROL/FATS  REDUCE PROCESSED SUGAR AS MUCH AS POSSIBLE  INCREASE WHOLE GRAINS/VEGETABLES    Lowering total cholesterol and LDL (bad) cholesterol:  - Eat leaner cuts of meat, or eliminate altogether if possible red meat, and frequently substitute fish or chicken.  - Limit saturated fat to no more than 7-10% of total calories no more than 10 g per day is recommended. Some sources of saturated fat include butter, animal fats, hydrogenated vegetable fats and oils, many desserts, whole milk dairy products.  - Replaced saturated fats with polyunsaturated fats and monounsaturated fats. Foods high in monounsaturated fat include nuts, although well, canola oil, avocados, and olives.  - Limit trans fat (processed foods) and replaced with fresh fruits and vegetables  - Recommend nonfat dairy products  - Increase substantially the amount of soluble fiber intake (legumes such as beans, fruit, whole grains).  - Consider  nutritional supplements: plant sterile spreads such as Benecol, fish oil,  flaxseed oil, omega-3 acids capsules 1000 mg twice a day, or viscous fiber such as Metamucil  - Attain ideal weight and regular exercise (at least 30 minutes per day of walking)    Lowering triglycerides:  - Reduce intake of simple sugar: Desserts, candy, pastries, honey, sodas, sugared cereals, yogurt, Gatorade, sports bars, canned fruit, smoothies, fruit juice, coffee drinks  - Reduced intake of refined starches: Refined Pasta  - Reduce or abstain from alcohol  - Increase omega-3 fatty acids: [place], Trout, Mackerel, [place]ring, [place] tuna and supplements  - Attain ideal weight and regular exercise (at least 30 minutes per day of walking)      Elevating HDL (good) cholesterol:  - Increase physical activity  - Seasoned foods with garlic and onions  - Increase omega-3 fatty acids and supplements as listed above  - Incorporating appropriate amounts of monounsaturated fats such as nuts, olive oil, canola oil, avocados, olives  - Stop smoking  - Attain ideal weight and regular exercise (at least 30 minutes per day of walking)

## 2020-09-01 NOTE — PROGRESS NOTES
Chief Complaint   Patient presents with   • New Patient     Chest Pain       Subjective:   Janell Sterling is a 55 y.o. female who presents today in consultation from JUNIOR Pelaez  For evaluation of her abnormal stress ECG in the setting of ongoing chest pains.  She has asthma and has been doing well on treatment for that over the last 6 months she has noted increasing shortness of breath and some chest discomfort under the left breast radiating into the left neck at times which have occurred both at rest and with exertion for example today walking in he describes similar pain her EKG today fortunately is normal work-up this she had appropriately a nuclear stress test with fair exercise tolerance but had developed ST depression in the lead II with normal images although there is likely some attenuation artifacts.  She has taken aspirin for any chest pains in the past which actually improved her symptoms.  She does have some stress at home being that she takes care of her family in the home and her son has some health issues that worry her although this has not been significantly increased in recent time.    She has significant claustrophobia with elevators and medical imaging she does not think that she cannot at all lie still or do controlled breathing in the setting of a CAT scan    Past Medical History:   Diagnosis Date   • Asthma      No past surgical history on file.  Family History   Problem Relation Age of Onset   • Heart Disease Father 76   • Heart Disease Maternal Grandmother    • Heart Disease Paternal Grandmother      Social History     Socioeconomic History   • Marital status:      Spouse name: Not on file   • Number of children: Not on file   • Years of education: Not on file   • Highest education level: Not on file   Occupational History   • Not on file   Social Needs   • Financial resource strain: Not on file   • Food insecurity     Worry: Not on file     Inability: Not on file   •  Transportation needs     Medical: Not on file     Non-medical: Not on file   Tobacco Use   • Smoking status: Never Smoker   • Smokeless tobacco: Never Used   Substance and Sexual Activity   • Alcohol use: No   • Drug use: No   • Sexual activity: Not on file   Lifestyle   • Physical activity     Days per week: Not on file     Minutes per session: Not on file   • Stress: Not on file   Relationships   • Social connections     Talks on phone: Not on file     Gets together: Not on file     Attends Muslim service: Not on file     Active member of club or organization: Not on file     Attends meetings of clubs or organizations: Not on file     Relationship status: Not on file   • Intimate partner violence     Fear of current or ex partner: Not on file     Emotionally abused: Not on file     Physically abused: Not on file     Forced sexual activity: Not on file   Other Topics Concern   • Not on file   Social History Narrative   • Not on file     No Known Allergies  Outpatient Encounter Medications as of 9/1/2020   Medication Sig Dispense Refill   • levothyroxine (SYNTHROID) 25 MCG Tab Take 1 Tab by mouth Every morning on an empty stomach. 30 Tab 1   • ergocalciferol (DRISDOL) 53955 UNIT capsule Take 1 Cap by mouth every 7 days. 12 Cap 1   • budesonide-formoterol (SYMBICORT) 80-4.5 MCG/ACT Aerosol Inhale 2 Puffs by mouth 2 Times a Day. 1 Each 11   • albuterol 108 (90 Base) MCG/ACT Aero Soln inhalation aerosol Inhale 2 Puffs by mouth every 6 hours as needed for Shortness of Breath. 8.5 g 11   • fluticasone (FLONASE) 50 MCG/ACT nasal spray Spray 1 Spray in nose every day. 16 g 11   • acetaminophen (TYLENOL) 500 MG Tab Take 1-2 Tabs by mouth every 6 hours as needed for Moderate Pain. Max dose 3000 mg in 24 hours 120 Tab 5     No facility-administered encounter medications on file as of 9/1/2020.      Review of Systems   Constitutional: Negative for chills and fever.   HENT: Negative for sore throat.    Eyes: Negative for  "blurred vision.   Respiratory: Negative for cough and shortness of breath.    Cardiovascular: Positive for chest pain and orthopnea. Negative for palpitations, claudication, leg swelling and PND.   Gastrointestinal: Negative for abdominal pain and nausea.   Musculoskeletal: Positive for back pain and neck pain. Negative for falls and joint pain.   Skin: Negative for rash.   Neurological: Positive for focal weakness. Negative for dizziness and weakness.   Endo/Heme/Allergies: Does not bruise/bleed easily.   Psychiatric/Behavioral: Positive for memory loss. The patient is nervous/anxious.         Objective:   BP (!) 0/0 (BP Location: Left arm, Patient Position: Sitting, BP Cuff Size: Adult)   Pulse 86   Ht 1.6 m (5' 3\")   Wt 60 kg (132 lb 3.2 oz)   SpO2 96%   BMI 23.42 kg/m²     Physical Exam   Constitutional: No distress.   HENT:   Patient wearing a mask due to COVID precautions   Eyes: No scleral icterus.   Neck: No JVD present.   Cardiovascular: Normal rate and normal heart sounds. Exam reveals no gallop and no friction rub.   No murmur heard.  Pulmonary/Chest: No respiratory distress. She has no wheezes. She has no rales.   Abdominal: Soft. Bowel sounds are normal.   Musculoskeletal:         General: No edema.   Neurological: She is alert.   Skin: No rash noted. She is not diaphoretic.   Psychiatric: She has a normal mood and affect.     I personally reviewed the tracings of her stress ECG show flat ST depression in lead II especially as well as the images which have some attenuation artifact or possibility for defect not entirely clear    We reviewed in person the most recent labs  Recent Results (from the past 4032 hour(s))   FASTING STATUS    Collection Time: 07/13/20 10:53 AM   Result Value Ref Range    Fasting Status Fasting    VITAMIN D,25 HYDROXY    Collection Time: 07/13/20 10:59 AM   Result Value Ref Range    25-Hydroxy   Vitamin D 25 20 (L) 30 - 100 ng/mL   TSH WITH REFLEX TO FT4    Collection Time: " 20 10:59 AM   Result Value Ref Range    TSH 5.610 (H) 0.380 - 5.330 uIU/mL   Lipid Profile    Collection Time: 20 10:59 AM   Result Value Ref Range    Cholesterol,Tot 183 100 - 199 mg/dL    Triglycerides 73 0 - 149 mg/dL    HDL 54 >=40 mg/dL     (H) <100 mg/dL   HEMOGLOBIN A1C    Collection Time: 20 10:59 AM   Result Value Ref Range    Glycohemoglobin 6.0 (H) 0.0 - 5.6 %    Est Avg Glucose 126 mg/dL   FREE THYROXINE    Collection Time: 20 10:59 AM   Result Value Ref Range    Free T-4 0.86 (L) 0.93 - 1.70 ng/dL   EKG    Collection Time: 20  9:59 AM   Result Value Ref Range    Report       [facility]    Test Date:  2020  Pt Name:    DINA STERLING                 Department: [facility]  MRN:        [id number]                      Room:  Gender:     Female                       Technician:   :        [id number]                   Requested By:SUSI OTTO  Order #:    [id number]                    Reading MD:    Measurements  Intervals                                Axis  Rate:       68                           P:          0  LA:         144                          QRS:        94  QRSD:       98                           T:          72  QT:         376  QTc:        400    Interpretive Statements  SINUS RHYTHM  BORDERLINE RIGHT AXIS DEVIATION  NONSPECIFIC T ABNORMALITIES, ANT-LAT LEADS  Compared to ECG 2014 16:31:55  T-wave abnormality now present         Assessment:     1. Other chest pain  EKG   2. Abnormal ECG during exercise stress test  CL-LEFT HEART CATHETERIZATION WITH POSSIBLE INTERVENTION    EC-ECHOCARDIOGRAM COMPLETE W/O CONT   3. Angina pectoris (HCC)  CL-LEFT HEART CATHETERIZATION WITH POSSIBLE INTERVENTION    EC-ECHOCARDIOGRAM COMPLETE W/O CONT       Medical Decision Making:  Today's Assessment / Status / Plan:     It was my pleasure to meet with Ms. Sterling.    Blood pressure is well controlled.  In fact her blood pressure is often on the low  normal side    For symptoms this is worrisome for angina she is not that physically active but has had the chest pains with activity with radiation her stress ECG is concerning for ischemia at a fair workload but the images are normal to inconclusive we discussed the available work-up typically CTA would be an acceptable anatomic test but I do not believe that she can achieve this successfully with her severe claustrophobia so we discussed the role of invasive coronary angiogram with possible coronary stenting she understands the risks and benefits of that and agrees to proceed in the interim we will start her on Imdur and nitroglycerin as needed because of her asthma she is unlikely to tolerate beta-blockers well as well as her hypotension not able to tolerate calcium channel blockers hopefully Imdur is well-tolerated and provides some relief.    I will see Ms. Sterling back in 1 month time and encouraged her to follow up with us over the phone or electronically using my [employer]note[employer] as issues arise.    It is my pleasure to participate in the care of Ms. Sterling.  Please do not hesitate to contact me with questions or concerns.    Levi Carlin MD PhD [facility]  Cardiologist [facility]    Please note that this dictation was created using voice recognition software. There may be errors I did not discover before finalizing the note.

## 2020-09-03 ENCOUNTER — PRE-ADMISSION TESTING (OUTPATIENT)
Dept: ADMISSIONS | Facility: MEDICAL CENTER | Age: 55
End: 2020-09-03
Attending: INTERNAL MEDICINE
Payer: MEDICAID

## 2020-09-09 ENCOUNTER — APPOINTMENT (OUTPATIENT)
Dept: CARDIOLOGY | Facility: MEDICAL CENTER | Age: 55
End: 2020-09-09
Attending: INTERNAL MEDICINE
Payer: MEDICAID

## 2020-09-09 ENCOUNTER — HOSPITAL ENCOUNTER (OUTPATIENT)
Facility: MEDICAL CENTER | Age: 55
End: 2020-09-09
Attending: INTERNAL MEDICINE | Admitting: INTERNAL MEDICINE
Payer: MEDICAID

## 2020-09-09 VITALS
OXYGEN SATURATION: 98 % | HEART RATE: 59 BPM | RESPIRATION RATE: 10 BRPM | TEMPERATURE: 97.3 F | HEIGHT: 63 IN | SYSTOLIC BLOOD PRESSURE: 90 MMHG | BODY MASS INDEX: 23.09 KG/M2 | DIASTOLIC BLOOD PRESSURE: 63 MMHG | WEIGHT: 130.29 LBS

## 2020-09-09 DIAGNOSIS — R07.89 OTHER CHEST PAIN: ICD-10-CM

## 2020-09-09 DIAGNOSIS — I20.9 ANGINA PECTORIS (HCC): ICD-10-CM

## 2020-09-09 DIAGNOSIS — R94.31 ABNORMAL ECG DURING EXERCISE STRESS TEST: ICD-10-CM

## 2020-09-09 PROCEDURE — 700117 HCHG RX CONTRAST REV CODE 255: Performed by: INTERNAL MEDICINE

## 2020-09-09 PROCEDURE — 700101 HCHG RX REV CODE 250

## 2020-09-09 PROCEDURE — 99153 MOD SED SAME PHYS/QHP EA: CPT

## 2020-09-09 PROCEDURE — 700102 HCHG RX REV CODE 250 W/ 637 OVERRIDE(OP)

## 2020-09-09 PROCEDURE — 93458 L HRT ARTERY/VENTRICLE ANGIO: CPT | Mod: 26 | Performed by: INTERNAL MEDICINE

## 2020-09-09 PROCEDURE — 99152 MOD SED SAME PHYS/QHP 5/>YRS: CPT | Performed by: INTERNAL MEDICINE

## 2020-09-09 PROCEDURE — A9270 NON-COVERED ITEM OR SERVICE: HCPCS

## 2020-09-09 PROCEDURE — 160002 HCHG RECOVERY MINUTES (STAT)

## 2020-09-09 PROCEDURE — 700111 HCHG RX REV CODE 636 W/ 250 OVERRIDE (IP)

## 2020-09-09 RX ORDER — LIDOCAINE HYDROCHLORIDE 20 MG/ML
INJECTION, SOLUTION INFILTRATION; PERINEURAL
Status: COMPLETED
Start: 2020-09-09 | End: 2020-09-09

## 2020-09-09 RX ORDER — ASPIRIN 81 MG/1
TABLET, CHEWABLE ORAL
Status: COMPLETED
Start: 2020-09-09 | End: 2020-09-09

## 2020-09-09 RX ORDER — SODIUM CHLORIDE 9 MG/ML
INJECTION, SOLUTION INTRAVENOUS CONTINUOUS
Status: DISCONTINUED | OUTPATIENT
Start: 2020-09-09 | End: 2020-09-09 | Stop reason: HOSPADM

## 2020-09-09 RX ORDER — MIDAZOLAM HYDROCHLORIDE 1 MG/ML
INJECTION INTRAMUSCULAR; INTRAVENOUS
Status: COMPLETED
Start: 2020-09-09 | End: 2020-09-09

## 2020-09-09 RX ORDER — VERAPAMIL HYDROCHLORIDE 2.5 MG/ML
INJECTION, SOLUTION INTRAVENOUS
Status: COMPLETED
Start: 2020-09-09 | End: 2020-09-09

## 2020-09-09 RX ORDER — HEPARIN SODIUM 200 [USP'U]/100ML
INJECTION, SOLUTION INTRAVENOUS
Status: COMPLETED
Start: 2020-09-09 | End: 2020-09-09

## 2020-09-09 RX ORDER — HEPARIN SODIUM,PORCINE 1000/ML
VIAL (ML) INJECTION
Status: COMPLETED
Start: 2020-09-09 | End: 2020-09-09

## 2020-09-09 RX ADMIN — FENTANYL CITRATE 75 MCG: 50 INJECTION INTRAMUSCULAR; INTRAVENOUS at 10:09

## 2020-09-09 RX ADMIN — VERAPAMIL HYDROCHLORIDE 5 MG: 2.5 INJECTION, SOLUTION INTRAVENOUS at 10:08

## 2020-09-09 RX ADMIN — MIDAZOLAM HYDROCHLORIDE 2 MG: 1 INJECTION, SOLUTION INTRAMUSCULAR; INTRAVENOUS at 10:09

## 2020-09-09 RX ADMIN — IOHEXOL 26 ML: 350 INJECTION, SOLUTION INTRAVENOUS at 10:11

## 2020-09-09 RX ADMIN — HEPARIN SODIUM: 1000 INJECTION, SOLUTION INTRAVENOUS; SUBCUTANEOUS at 10:08

## 2020-09-09 RX ADMIN — LIDOCAINE HYDROCHLORIDE: 20 INJECTION, SOLUTION INFILTRATION; PERINEURAL at 10:09

## 2020-09-09 RX ADMIN — HEPARIN SODIUM 2000 UNITS: 200 INJECTION, SOLUTION INTRAVENOUS at 10:09

## 2020-09-09 RX ADMIN — ASPIRIN 324 MG: 81 TABLET, CHEWABLE ORAL at 09:27

## 2020-09-09 RX ADMIN — NITROGLYCERIN 10 ML: 20 INJECTION INTRAVENOUS at 10:08

## 2020-09-09 ASSESSMENT — FIBROSIS 4 INDEX: FIB4 SCORE: 0.87

## 2020-09-09 NOTE — DISCHARGE INSTRUCTIONS
ACTIVITY: Rest and take it easy for the first 24 hours.  A responsible adult is recommended to remain with you during that time.  It is normal to feel sleepy.  We encourage you to not do anything that requires balance, judgment or coordination.    MILD FLU-LIKE SYMPTOMS ARE NORMAL. YOU MAY EXPERIENCE GENERALIZED MUSCLE ACHES, THROAT IRRITATION, HEADACHE AND/OR SOME NAUSEA.    FOR 24 HOURS DO NOT:  Drive, operate machinery or run household appliances.  Drink beer or alcoholic beverages.   Make important decisions or sign legal documents.    DIET: To avoid nausea, slowly advance diet as tolerated, avoiding spicy or greasy foods for the first day.  Add more substantial food to your diet according to your physician's instructions.  Babies can be fed formula or breast milk as soon as they are hungry.  INCREASE FLUIDS AND FIBER TO AVOID CONSTIPATION.    SURGICAL DRESSING/BATHING: Keep dressing dry for 24 hours. May remove dressing and shower after 2 pm on 9/10, do not need to replace dressing. Do not submerge in water or bath for 7 days.     FOLLOW-UP APPOINTMENT:  A follow-up appointment should be arranged with your doctor in 1 week; call to schedule.    You should CALL YOUR PHYSICIAN if you develop:  Fever greater than 101 degrees F.  Pain not relieved by medication, or persistent nausea or vomiting.  Excessive bleeding (blood soaking through dressing) or unexpected drainage from the wound.  Extreme redness or swelling around the incision site, drainage of pus or foul smelling drainage.  Inability to urinate or empty your bladder within 8 hours.  Problems with breathing or chest pain.    You should call 911 if you develop problems with breathing or chest pain.  If you are unable to contact your doctor or surgical center, you should go to the nearest emergency room or urgent care center.  Physician's telephone #: CARDIOLOGY [phone]    If any questions arise, call your doctor.  If your doctor is not available, please  feel free to call the Surgical Center at [phone].  The Center is open Monday through Friday from 7AM to 7PM.  You can also call the HEALTH HOTLINE open 24 hours/day, 7 days/week and speak to a nurse at [phone], or toll free at [phone].    A registered nurse may call you a few days after your surgery to see how you are doing after your procedure.    MEDICATIONS: Resume taking daily medication.  Take prescribed pain medication with food.  If no medication is prescribed, you may take non-aspirin pain medication if needed.  PAIN MEDICATION CAN BE VERY CONSTIPATING.  Take a stool softener or laxative such as senokot, pericolace, or milk of magnesia if needed.    If your physician has prescribed pain medication that includes Acetaminophen (Tylenol), do not take additional Acetaminophen (Tylenol) while taking the prescribed medication.    Depression / Suicide Risk    As you are discharged from this [facility] facility, it is important to learn how to keep safe from harming yourself.    Recognize the warning signs:  · Abrupt changes in personality, positive or negative- including increase in energy   · Giving away possessions  · Change in eating patterns- significant weight changes-  positive or negative  · Change in sleeping patterns- unable to sleep or sleeping all the time   · Unwillingness or inability to communicate  · Depression  · Unusual sadness, discouragement and loneliness  · Talk of wanting to die  · Neglect of personal appearance   · Rebelliousness- reckless behavior  · Withdrawal from people/activities they love  · Confusion- inability to concentrate     If you or a loved one observes any of these behaviors or has concerns about self-harm, here's what you can do:  · Talk about it- your feelings and reasons for harming yourself  · Remove any means that you might use to hurt yourself (examples: pills, rope, extension cords, firearm)  · Get professional help from the community (Mental  Health, Substance Abuse, psychological counseling)  · Do not be alone:Call your Safe Contact- someone whom you trust who will be there for you.  · Call your local CRISIS HOTLINE 558-1340 or 969-577-7888  · Call your local Children's Mobile Crisis Response Team [place] (684) 472-6718 or www.Pricebets  · Call the toll free National Suicide Prevention Hotlines   · National Suicide Prevention Lifeline 913-899-HUHB (6098)  · [employer] Hope Line Network 800-SUICIDE (647-3766)    POST ANGIOGRAM  General Care Instructions  • Maintain a bandage over the incision site for 24 hours.  It's normal to find a small bruise or dime-sized lump at the insertion site. This should disappear within a few weeks.  • Do not apply lotions or powders to the site.  Do not immerse the catheter insertion site in water (bathtub/swimming) for five days. It is ok to shower 24 hours after the procedure.  • You may resume your normal diet immediately; on the day of your procedure, drink 6-10 glasses of water to help flush the contrast liquid out of your system.  • If the doctor inserted the catheter in your arm:  o For 24 hours, DO NOT lift anything heavier than 10 pounds (approximately a gallon of milk). DO NOT do any heavy pushing, pulling, or twisting.    Medications  • If your current medications need to be changed, you will be provided with an updated list of your medications prior to discharge.  • If you take warfarin (Coumadin), resume taking your usual dose the evening after the procedure.  • DO NOT STOP taking prescribed blood thinning (anti-platelet) medications unless instructed by your cardiologist.  These medications include:  o Aspirin, Clopidogrel (Plavix), Ticagrelor (Brilinta), or Prasugrel (Effient)   • If you take one of the following anticoagulants, RESUME 24 HOURS after your procedure:  o Apixiban (Eliquis), Rivaroxaban (Xarelto), Dabigatran (Pradaxa), Edoxaban (Savaysa)  • If you take metformin (Glucophage), RESUME  48 HOURS after your procedure.    When to call your healthcare provider  Call your cardiologist right away at [phone] if you have any of the following:  ?  Problems/Concerns taking any of your prescribed heart medicines.  ?  The insertion site has increasing pain, swelling, redness, bleeding, or drainage.  ?  Your arm or leg below where the insertion site changes color, is cool, or is numb.  ?  You have chest pain or shortness of breath that does not go away with rest.  ?  Your pulse feels irregular -- very slow (less than 60 beats/minute) or very fast (over 100 beats/minute).  ?  You have dizziness, fainting, or you are very tired.  ?  You are coughing up blood or yellow or green mucus.  ?  You have chills or a fever over 101°F (38.3°C).   ?  If there is bleeding at the catheter insertion site, apply pressure for 10 minutes.  If bleeding persists, call 911, and continue to hold pressure until advanced medical support arrives.        Exercising Safely After Percutaneous Coronary Intervention (PCI)  After percutaneous coronary intervention (PCI), which involves angioplasty and often stenting, it's important to focus on your heart health. Exercise can help strengthen your heart. It can also help you feel good and improve your overall health. Talk with your health care provider or cardiac rehab team member about good options for you.  • Start slowly. Work up to more vigorous exercise as you get stronger. Aim for at least 150 minutes of exercise each week.  • Include aerobic activities. These make the heart beat faster. They work the heart and lungs, and improve the body's ability to use oxygen. Good choices include walking, swimming, and biking .  Always follow your doctor's recommendation for exercise.   You have been referred to cardiac rehabilitation, which is important for your recovery.  You may contact [facility] at [phone] to learn more and schedule a visit.        Lifestyle  Management After Percutaneous Coronary Intervention (PCI)  Percutaneous coronary intervention (PCI)  involves angioplasty and often stenting. This procedure can open arteries and relieve symptoms. But, it doesn't cure coronary artery disease. New blockages can still form. You need to take steps to prevent this by managing risk factors. Doing so will help make your heart and arteries healthier. Your doctor may prescribe cardiac rehabilitation to help with this lifelong process.  Understanding risk factors  Some risk factors for coronary artery disease can be controlled. These include smoking, high blood pressure, cholesterol, diabetes, and obesity. They can be managed with medication, diet, and exercise. Support and counseling can also play a role. The effort will pay off! Managing risk factors can help you be more active, feel better, and reduce the risk of heart attack.    If you smoke, quit!  If your doctor has been urging you to quit smoking, it's for good reasons. Smoking damages your heart, blood vessels, and lungs. The good news is that quitting can halt or even reverse the damage of smoking. To quit now:  • Get medical help. Ask your doctor for advice on stop-smoking programs. Also ask about medications or nicotine replacement therapy products that may help you quit smoking.  • Get support. Join a support group. Ask for help from your family and friends.  • Don't give up. It often takes several tries to succeed in quitting smoking.  • Avoid secondhand smoke. Ask family and friends not to smoke around you.

## 2020-09-09 NOTE — PROCEDURES
"CARDIAC CATHETERIZATION REPORT    REFERRING: Esteban Wolfe M.D.    PROCEDURE PHYSICIAN: Esteban Wolfe MD, [facility], [facility]  ASSISTANT: None    IMPRESSIONS:  1. Non-coronary chest symptoms with false positive stress ECG  2. Normal coronary arteries  3. Normal LV systolic function  4. Normal LVEDP    Recommendations:  Usual post catheterization care    Pre-procedure diagnosis: Chest pain  Post-procedure diagnosis: Non-coronary chest pain    Procedure performed  Selective coronary angiography  Left heart catheterization    Conscious sedation was supervised by myself and administered by trained personnel using fentanyl and versed between 0932 and 1014. The patient tolerated sedation without complication.     Procedure Description  1. Access: 6 [place] right radial artery Micropuncture technique was utilized following local anesthesia with lidocaine.     2. Diagnostic description: The catheter was passed to the central circulation with the aide of J tipped 0.35\" wire. A 5F TIG 4.0 and 4F pigtail were used to inject the coronary circulation and inject the left ventricle during invasive hemodynamic monitoring. Once all diagnostic information was obtained the equipment was removed from the body.      3. Hemostasis: Radial band      Findings   Hemodynamics: Aorta: 114/66 mmHg  LV: 116/15 mmHg    Coronary Anatomy   Left Main: Normal   LAD: Normal   LCx: Normal   RCA: Dominant, Normal    Left Ventriculography: LVEF= 65%. Normal wall motion, no mitral regurgitation. Normal caliber ascensing aorta    Technical Factors  1. Complications: None  2. Estimated Blood Loss: <50 cc  3. Specimens: None  4. Contrast Volume: 26 ml  5. Medications: Radial cocktail (Verapamil 2.5 mg, Nitroglycerin 100 mcg) Heparin 5000 Units  6. Radiation (air kerma): 60 mGy      "

## 2020-09-09 NOTE — OR NURSING
1027 Pt arrived to PPU with Cath lab RN. AAOx4. VSS. Denies pain and nausea. Right TR Band dressing is CDI and soft. CMS intact. Pt denies numbness/tingling. Belongings returned to pt bedside. POC update given to pt and  at bedside with Dr. Wolfe.     1115 3cc air removed from TR band. CDI with no bleeding.   1130 3cc air removed from TR band. CDI with no bleeding.   1145 3cc air removed from TR band. CDI with no bleeding.   1200 3cc air removed from TR band. CDI with no bleeding. TR band removed and replaced with tegaderm, gauze and coban wrap. CMS intact. Pt denies numbness/tinging.     1200 Criteria met. Pt given discharge instructions. Discussed diet, activity, follow-up, symptoms and management. IV d/c'd. All questions answered. Pt discharged via wheelchair by CNA.

## 2020-09-10 ENCOUNTER — TELEPHONE (OUTPATIENT)
Dept: CARDIOLOGY | Facility: MEDICAL CENTER | Age: 55
End: 2020-09-10

## 2020-09-10 NOTE — TELEPHONE ENCOUNTER
Levi Carlin M.D.  Roma Romero R.N.             Let her know I looked over the records and fortunately the heart catheterization was normal so she has a false positive EKG which is the best possible outcome from today's test.     Thank you        Called pt and informed her of the above info by CW, pt is appreciative of the call.

## 2020-11-08 DIAGNOSIS — E03.8 OTHER SPECIFIED HYPOTHYROIDISM: ICD-10-CM

## 2020-11-12 RX ORDER — LEVOTHYROXINE SODIUM 0.03 MG/1
25 TABLET ORAL
Qty: 30 TAB | Refills: 0 | Status: SHIPPED | OUTPATIENT
Start: 2020-11-12
